# Patient Record
(demographics unavailable — no encounter records)

---

## 2024-10-15 NOTE — DEVELOPMENTAL MILESTONES
[Uses basic gestures] : uses basic gestures [Says "Jose" or "Mama"] : says "Jose" or "Mama" nonspecifically [Sits well without support] : sits well without support [Transitions between sitting and lying] : transitions between sitting and lying [Balances on hands and knees] : balances on hands and knees [Crawls] : crawls [Picks up small objects with 3 fingers] : picks up small objects with 3 fingers and thumb [Releases objects intentionally] : releases objects intentionally [[place] objects together] : bangs objects together [Normal Development] : Normal Development [None] : none [Yes] : Completed.

## 2024-10-15 NOTE — HISTORY OF PRESENT ILLNESS
[Mother] : mother [Breast milk] : breast milk [___ Feeding per 24 hrs] : a total of [unfilled] feedings is 24 hours [Fruit] : fruit [Vegetables] : vegetables [Cereal] : cereal [Meat] : meat [Eggs] : eggs [Peanut] : peanut [Baby food] : baby food [Normal] : Normal [___ voids per day] : [unfilled] voids per day [Frequency of stools: ___] : Frequency of stools: [unfilled]  stools [per day] : per day. [In Crib] : sleeps in crib [On back] : sleeps on back [Wakes up at night] : wakes up at night [Sippy Cup use] : sippy cup use [Screen time only for video chatting] : screen time only for video chatting [No] : No cigarette smoke exposure [Rear facing car seat in  back seat] : Rear facing car seat in  back seat [Carbon Monoxide Detectors] : Carbon monoxide detectors [Smoke Detectors] : Smoke detectors [NO] : No [Co-sleeping] : no co-sleeping [Pacifier use] : not using pacifier [Brushing teeth] : not brushing teeth [FreeTextEntry7] : noticing dry skin near neck and arms [de-identified] : feeds once at 2AM [FreeTextEntry1] : 9mo here for WCC. Has had a cough and congestion for 3 days, mom noticed rash on the body starting yesterday. Has been POing and urinating appropriately. Feeds multiple solids, does take a feed at night at 2AM.

## 2024-10-15 NOTE — PHYSICAL EXAM
[Alert] : alert [Normocephalic] : normocephalic [Flat Open Anterior [place]] : flat open anterior fontanelle [Red Reflex] : red reflex bilateral [PERRL] : PERRL [Normally Placed Ears] : normally placed ears [Auricles Well Formed] : auricles well formed [Clear Tympanic membranes] : clear tympanic membranes [Light reflex present] : light reflex present [Bony landmarks visible] : bony landmarks visible [Nares Patent] : nares patent [Palate Intact] : palate intact [Uvula Midline] : uvula midline [Supple, full passive range of motion] : supple, full passive range of motion [Symmetric Chest Rise] : symmetric chest rise [Clear to Auscultation Bilaterally] : clear to auscultation bilaterally [Regular Rate and Rhythm] : regular rate and rhythm [S1, S2 present] : S1, S2 present [+2 Femoral Pulses] : (+) 2 femoral pulses [Soft] : soft [Bowel Sounds] : bowel sounds present [Normal External Genitalia] : normal external genitalia [No Abnormal Lymph Nodes Palpated] : no abnormal lymph nodes palpated [Symmetric abduction and rotation of hips] : symmetric abduction and rotation of hips [Straight] : straight [Acute Distress] : no acute distress [Excessive Tearing] : no excessive tearing [Discharge] : no discharge [Palpable Masses] : no palpable masses [Murmurs] : no murmurs [Tender] : nontender [Distended] : nondistended [Hepatomegaly] : no hepatomegaly [Splenomegaly] : no splenomegaly [de-identified] : viral exanthum noted across torso and back

## 2024-10-15 NOTE — DEVELOPMENTAL MILESTONES
[Uses basic gestures] : uses basic gestures [Says "Jose" or "Mama"] : says "Jose" or "Mama" nonspecifically [Sits well without support] : sits well without support [Transitions between sitting and lying] : transitions between sitting and lying [Balances on hands and knees] : balances on hands and knees [Crawls] : crawls [Picks up small objects with 3 fingers] : picks up small objects with 3 fingers and thumb [Releases objects intentionally] : releases objects intentionally [[place] objects together] : bangs objects together [Normal Development] : Normal Development [None] : none [Yes] : Completed.

## 2024-10-15 NOTE — DISCUSSION/SUMMARY
[Normal Growth] : growth [Normal Development] : development [No Elimination Concerns] : elimination [No Medications] : ~He/She~ is not on any medications [Mother] : mother [] : The components of the vaccine(s) to be administered today are listed in the plan of care. The disease(s) for which the vaccine(s) are intended to prevent and the risks have been discussed with the caretaker.  The risks are also included in the appropriate vaccination information statements which have been provided to the patient's caregiver.  The caregiver has given consent to vaccinate. [de-identified] : eliminate nighttime feeds [de-identified] : viral exanthum present [FreeTextEntry1] : This is a 9mo here for [facility].   URI/viral exanthum: - Pt with mild URI symptoms and a viral exanthum; advised mom to do symptomatic management at home, and that rash will resolve on its own.  - Advised to present to ED if pt having any difficulty breathing, significantly decreased PO or UOP.   Night feeds: - still feeding once at night, advised mom to stop night feeds and allow child to sleep through the night  Maintenance: - received flu vaccine, given script for CBC and lead - SWYC negative  - lives in [place] - has Polyviflor at home - Continue breastmilk or formula as desired. Increase table foods, 3 meals with 2-3 snacks per day. Incorporate up to 6 oz of fluorinated water daily in a sippy cup. Discussed weaning of bottle and pacifier. Wipe teeth daily with washcloth. When in car, patient should be in rear-facing car seat in back seat. Put baby to sleep in own crib with no loose or soft bedding. Lower crib matress. Help baby to maintain consistent daily routines and sleep schedule. Recognize stranger anxiety. Ensure home is safe since baby is increasingly mobile. Be within arm's reach of baby at all times. Use consistent, positive discipline. Avoid screen time. Read aloud to baby.

## 2024-10-15 NOTE — DISCUSSION/SUMMARY
[Normal Growth] : growth [Normal Development] : development [No Elimination Concerns] : elimination [No Medications] : ~He/She~ is not on any medications [Mother] : mother [] : The components of the vaccine(s) to be administered today are listed in the plan of care. The disease(s) for which the vaccine(s) are intended to prevent and the risks have been discussed with the caretaker.  The risks are also included in the appropriate vaccination information statements which have been provided to the patient's caregiver.  The caregiver has given consent to vaccinate. [de-identified] : eliminate nighttime feeds [de-identified] : viral exanthum present [FreeTextEntry1] : This is a 9mo here for [facility].   URI/viral exanthum: - Pt with mild URI symptoms and a viral exanthum; advised mom to do symptomatic management at home, and that rash will resolve on its own.  - Advised to present to ED if pt having any difficulty breathing, significantly decreased PO or UOP.   Night feeds: - still feeding once at night, advised mom to stop night feeds and allow child to sleep through the night  Maintenance: - received flu vaccine, given script for CBC and lead - SWYC negative  - lives in [place] - has Polyviflor at home - Continue breastmilk or formula as desired. Increase table foods, 3 meals with 2-3 snacks per day. Incorporate up to 6 oz of fluorinated water daily in a sippy cup. Discussed weaning of bottle and pacifier. Wipe teeth daily with washcloth. When in car, patient should be in rear-facing car seat in back seat. Put baby to sleep in own crib with no loose or soft bedding. Lower crib matress. Help baby to maintain consistent daily routines and sleep schedule. Recognize stranger anxiety. Ensure home is safe since baby is increasingly mobile. Be within arm's reach of baby at all times. Use consistent, positive discipline. Avoid screen time. Read aloud to baby.

## 2024-10-15 NOTE — HISTORY OF PRESENT ILLNESS
[Mother] : mother [Breast milk] : breast milk [___ Feeding per 24 hrs] : a total of [unfilled] feedings is 24 hours [Fruit] : fruit [Vegetables] : vegetables [Cereal] : cereal [Meat] : meat [Eggs] : eggs [Peanut] : peanut [Baby food] : baby food [Normal] : Normal [___ voids per day] : [unfilled] voids per day [Frequency of stools: ___] : Frequency of stools: [unfilled]  stools [per day] : per day. [In Crib] : sleeps in crib [On back] : sleeps on back [Wakes up at night] : wakes up at night [Sippy Cup use] : sippy cup use [Screen time only for video chatting] : screen time only for video chatting [No] : No cigarette smoke exposure [Rear facing car seat in  back seat] : Rear facing car seat in  back seat [Carbon Monoxide Detectors] : Carbon monoxide detectors [Smoke Detectors] : Smoke detectors [NO] : No [Co-sleeping] : no co-sleeping [Pacifier use] : not using pacifier [Brushing teeth] : not brushing teeth [FreeTextEntry7] : noticing dry skin near neck and arms [de-identified] : feeds once at 2AM [FreeTextEntry1] : 9mo here for WCC. Has had a cough and congestion for 3 days, mom noticed rash on the body starting yesterday. Has been POing and urinating appropriately. Feeds multiple solids, does take a feed at night at 2AM.

## 2024-10-15 NOTE — PHYSICAL EXAM
[Alert] : alert [Normocephalic] : normocephalic [Flat Open Anterior [place]] : flat open anterior fontanelle [Red Reflex] : red reflex bilateral [PERRL] : PERRL [Normally Placed Ears] : normally placed ears [Auricles Well Formed] : auricles well formed [Clear Tympanic membranes] : clear tympanic membranes [Light reflex present] : light reflex present [Bony landmarks visible] : bony landmarks visible [Nares Patent] : nares patent [Palate Intact] : palate intact [Uvula Midline] : uvula midline [Supple, full passive range of motion] : supple, full passive range of motion [Symmetric Chest Rise] : symmetric chest rise [Clear to Auscultation Bilaterally] : clear to auscultation bilaterally [Regular Rate and Rhythm] : regular rate and rhythm [S1, S2 present] : S1, S2 present [+2 Femoral Pulses] : (+) 2 femoral pulses [Soft] : soft [Bowel Sounds] : bowel sounds present [Normal External Genitalia] : normal external genitalia [No Abnormal Lymph Nodes Palpated] : no abnormal lymph nodes palpated [Symmetric abduction and rotation of hips] : symmetric abduction and rotation of hips [Straight] : straight [Acute Distress] : no acute distress [Excessive Tearing] : no excessive tearing [Discharge] : no discharge [Palpable Masses] : no palpable masses [Murmurs] : no murmurs [Tender] : nontender [Distended] : nondistended [Hepatomegaly] : no hepatomegaly [Splenomegaly] : no splenomegaly [de-identified] : viral exanthum noted across torso and back

## 2024-11-14 NOTE — HISTORY OF PRESENT ILLNESS
[Influenza] : Influenza [FreeTextEntry1] :  Pt received influenza vaccine IM in  left thigh as ordered Pt tolerated administration of all vaccines Education provided on side effects of vaccines administered today Vaccine informational sheet given to parent

## 2025-03-04 NOTE — HISTORY OF PRESENT ILLNESS
[Mother] : mother [Breast milk] : breast milk [Fruit] : fruit [Vegetables] : vegetables [Meat] : meat [Dairy] : dairy [Finger food] : finger food [Table food] : table food [___ stools per day] : [unfilled]  stools per day [___ voids per day] : [unfilled] voids per day [Normal] : Normal [In crib] : In crib [Wakes up at night] : Wakes up at night [Sippy cup use] : Sippy cup use [Brushing teeth] : Brushing teeth [Toothpaste] : Primary Fluoride Source: Toothpaste [Playtime] : Playtime  [No] : No cigarette smoke exposure [Car seat in back seat] : Car seat in back seat [Up to date] : Up to date [Exposure to electronic nicotine delivery system] : No exposure to electronic nicotine delivery system [FreeTextEntry7] : Patient has been well, mild rash that is getting better  [de-identified] : Mild rash  [FreeTextEntry3] : Once nightly at 2am [de-identified] : Now that patient received 12mo vax in this visit  [FreeTextEntry1] : Patient is a healthy 13mo F presenting for her 12mo C. She has been growing and developing well. She is not a picky eater and eats Breakfast, lunch, and dinner, with breastfeeding once in the afternoon and at 2am. She can say mom and dad and is walking. She can feed herself with her hands. She uses a sippy cup, not a bottle. She does not use a pacifier. She stools 2x/d and has 6 wet diapers/d. She sleeps 9pm-2am, breastfeeds, and then sleeps until 7 or 8am. She takes 1 hour nap in the middle of the day. She brushes her teeth daily but has not yet seen a dentist. She has dry skin on her extremities, but it resolves with aquifer.   No changes to family medical hx. Of note, several family members have eczema, she has one Aunt with asthma, and has a cousin with a cystic kidney disease.

## 2025-03-04 NOTE — CARE PLAN
[Care Plan reviewed and provided to patient/caregiver] : Care plan reviewed and provided to patient/caregiver [Understands and communicates without difficulty] : Patient/Caregiver understands and communicates without difficulty [FreeTextEntry2] : Try to allow patient to sleep through the night and avoid 2am brestfeeding. Return to clinic in 4-6 weeks for 15mo WCC and Vaccines (no acute developmental concerns, but needs to stay UTD on vaccines) Schedule a dental appointment  Continue to support child's development through interactions and avoiding screentime  [FreeTextEntry3] : See patient in 4-6 weeks for vaccines.

## 2025-03-04 NOTE — HISTORY OF PRESENT ILLNESS
[Mother] : mother [Breast milk] : breast milk [Fruit] : fruit [Vegetables] : vegetables [Meat] : meat [Dairy] : dairy [Finger food] : finger food [Table food] : table food [___ stools per day] : [unfilled]  stools per day [___ voids per day] : [unfilled] voids per day [Normal] : Normal [In crib] : In crib [Wakes up at night] : Wakes up at night [Sippy cup use] : Sippy cup use [Brushing teeth] : Brushing teeth [Toothpaste] : Primary Fluoride Source: Toothpaste [Playtime] : Playtime  [No] : No cigarette smoke exposure [Car seat in back seat] : Car seat in back seat [Up to date] : Up to date [Exposure to electronic nicotine delivery system] : No exposure to electronic nicotine delivery system [FreeTextEntry7] : Patient has been well, mild rash that is getting better  [de-identified] : Mild rash  [FreeTextEntry3] : Once nightly at 2am [de-identified] : Now that patient received 12mo vax in this visit  [FreeTextEntry1] : Patient is a healthy 13mo F presenting for her 12mo C. She has been growing and developing well. She is not a picky eater and eats Breakfast, lunch, and dinner, with breastfeeding once in the afternoon and at 2am. She can say mom and dad and is walking. She can feed herself with her hands. She uses a sippy cup, not a bottle. She does not use a pacifier. She stools 2x/d and has 6 wet diapers/d. She sleeps 9pm-2am, breastfeeds, and then sleeps until 7 or 8am. She takes 1 hour nap in the middle of the day. She brushes her teeth daily but has not yet seen a dentist. She has dry skin on her extremities, but it resolves with aquifer.   No changes to family medical hx. Of note, several family members have eczema, she has one Aunt with asthma, and has a cousin with a cystic kidney disease.

## 2025-03-04 NOTE — DISCUSSION/SUMMARY
[Normal Growth] : growth [Normal Development] : development [None] : No known medical problems [No Elimination Concerns] : elimination [No Feeding Concerns] : feeding [No Skin Concerns] : skin [Normal Sleep Pattern] : sleep [No Medications] : ~He/She~ is not on any medications [Parent/Guardian] : parent/guardian [FreeTextEntry1] : Patient is a healthy 13mo F who is growing and developing well.   #Vaccines  - Today patient received 12mo Vaccines: PCV20, MMR, VSV, and HepA  - Patient has already been given 2 doses of the flu vaccine - Patient advised to come back to clinic in 4-6 weeks for 15 mo vaccines  #Feeding/Nutrition  - Patient should continue to eat balanced meals and be praised for trying new foods  - Patient's mom can continue to breastfeed in the afternoon and/or at bedtime, but patient should not get breastmilk at 2am. She can fast through the night with no risk to her health at this age.   # Dental Health  - MOC given list of dentists  - Encouraged to continue to help patient with toothbrushing   #Eczema  - Continue to use Aquiphor - If rash worsens, return to clinic for evaluation and possible prescription of hydrocort

## 2025-03-04 NOTE — PHYSICAL EXAM
[Alert] : alert [Normocephalic] : normocephalic [Closed Anterior [place]] : closed anterior fontanelle [Red Reflex] : red reflex bilateral [PERRL] : PERRL [Normally Placed Ears] : normally placed ears [Auricles Well Formed] : auricles well formed [Clear Tympanic membranes] : clear tympanic membranes [Light reflex present] : light reflex present [Bony landmarks visible] : bony landmarks visible [Nares Patent] : nares patent [Palate Intact] : palate intact [Uvula Midline] : uvula midline [Tooth Eruption] : tooth eruption [Supple, full passive range of motion] : supple, full passive range of motion [Symmetric Chest Rise] : symmetric chest rise [Clear to Auscultation Bilaterally] : clear to auscultation bilaterally [Regular Rate and Rhythm] : regular rate and rhythm [S1, S2 present] : S1, S2 present [+2 Femoral Pulses] : (+) 2 femoral pulses [Soft] : soft [Bowel Sounds] : normoactive bowel sounds [Normal External Genitalia] : normal external genitalia [Normal Vaginal Introitus] : normal vaginal introitus [No Abnormal Lymph Nodes Palpated] : no abnormal lymph nodes palpated [Symmetric Abduction and Rotation of Hips] : symmetric abduction and rotation of hips [Straight] : straight [Cranial Nerves Grossly Intact] : cranial nerves grossly intact [Discharge] : no discharge [Palpable Masses] : no palpable masses [Murmurs] : no murmurs [Tender] : nontender [Distended] : nondistended [Hepatomegaly] : no hepatomegaly [Splenomegaly] : no splenomegaly [Clitoromegaly] : no clitoromegaly [Allis Sign] : negative Allis sign [Rash or Lesions] : no rash/lesions

## 2025-03-04 NOTE — DEVELOPMENTAL MILESTONES
[Normal Development] : Normal Development [None] : none [Looks for hidden objects] : looks for hidden objects [Imitates new gestures] : imitates new gestures [Says "Dad" or "Mom" with meaning] : says "Dad" or "Mom" with meaning [Uses one word other than Mom or] : uses one word other than Mom or Dad or personal names [Follows a verbal command that] : follows a verbal command that includes a gesture [Takes first independent] : takes first independent steps [Stands without support] : stands without support [Drops object in a cup] : drops object in a cup [Picks up small object with 2 finger] : picks up small object with 2 finger pincer grasp [Picks up food and eats it] : picks up food and eats it [Yes] : Completed. [FreeTextEntry1] : Negative

## 2025-03-04 NOTE — PHYSICAL EXAM
[Alert] : alert [Normocephalic] : normocephalic [Closed Anterior [place]] : closed anterior fontanelle [Red Reflex] : red reflex bilateral [PERRL] : PERRL [Normally Placed Ears] : normally placed ears [Auricles Well Formed] : auricles well formed [Clear Tympanic membranes] : clear tympanic membranes [Light reflex present] : light reflex present [Bony landmarks visible] : bony landmarks visible [Nares Patent] : nares patent [Palate Intact] : palate intact [Uvula Midline] : uvula midline [Tooth Eruption] : tooth eruption [Supple, full passive range of motion] : supple, full passive range of motion [Symmetric Chest Rise] : symmetric chest rise [Clear to Auscultation Bilaterally] : clear to auscultation bilaterally [Regular Rate and Rhythm] : regular rate and rhythm [S1, S2 present] : S1, S2 present [+2 Femoral Pulses] : (+) 2 femoral pulses [Soft] : soft [Bowel Sounds] : normoactive bowel sounds [Normal External Genitalia] : normal external genitalia [Normal Vaginal Introitus] : normal vaginal introitus [No Abnormal Lymph Nodes Palpated] : no abnormal lymph nodes palpated [Symmetric Abduction and Rotation of Hips] : symmetric abduction and rotation of hips [Straight] : straight [Cranial Nerves Grossly Intact] : cranial nerves grossly intact [Discharge] : no discharge [Palpable Masses] : no palpable masses [Murmurs] : no murmurs [Tender] : nontender [Distended] : nondistended [Hepatomegaly] : no hepatomegaly [Splenomegaly] : no splenomegaly [Clitoromegaly] : no clitoromegaly [Allis Sign] : negative Allis sign [Rash or Lesions] : no rash/lesions

## 2025-06-12 NOTE — HISTORY OF PRESENT ILLNESS
[de-identified] : febrile seizure [FreeTextEntry6] : earlier this week - fever, with subsequent short seizure seen at outside hospital ED no interventions at that time has had a URI Dx with OM in ED seizure - simple, less than two minutes in duration runny nose feeling better no cough no vomiting or diarrhea acting well feeding well was prescribed antibiotics, Amoxicillin

## 2025-06-12 NOTE — DISCUSSION/SUMMARY
[FreeTextEntry1] : URI/OM resolved supportive care complete course of Amoxicillin  Simple febrile seizure discussed with mother  f/u as arranged for routine 18 month wcc

## 2025-07-20 NOTE — DISCUSSION/SUMMARY
[Family Support] : family support [Child Development and Behavior] : child development and behavior [Language Promotion/Hearing] : language promotion/hearing [Toliet Training Readiness] : toliet training readiness [Safety] : safety [FreeTextEntry1] : Growing and developing well Vitals WNL No acute concerns today Vaccines given including VZV #2 and Hep A #2 - tolerated well Amblyopia screen negative CBC and Lead today MCHAT screen negative SDOH family wellness screen reviewed  Anticipatory guidance: Discussed continuing a well-balanced diet. Continue table foods, whole cow's milk, 3 meals with 2-3 snacks per day.  Incorporate fluorinated water daily in a sippy cup. Brush teeth twice a day with soft toothbrush. Recommend visit to dentist.  When in car, keep child in rear-facing car seats until age 2, or until the maximum height and weight for seat is reached.  Put toddler to sleep in own bed or crib. Help toddler to maintain consistent daily routines and sleep schedule. Toilet training discussed.  Ensure home is safe. Use consistent, positive discipline.  Read aloud to toddler. Recognize anxiety in new settings. Be within arm's reach of toddler at all times.   RTC in 6 months for 2 yr [facility] or sooner should concerns arise

## 2025-07-20 NOTE — PHYSICAL EXAM

## 2025-07-20 NOTE — DISCUSSION/SUMMARY
[Family Support] : family support [Child Development and Behavior] : child development and behavior [Language Promotion/Hearing] : language promotion/hearing [Toliet Training Readiness] : toliet training readiness [Safety] : safety [FreeTextEntry1] : Growing and developing well Vitals WNL No acute concerns today Vaccines given including VZV #2 and Hep A #2 - tolerated well Amblyopia screen negative CBC and Lead today MCHAT screen negative SDOH family wellness screen reviewed  Anticipatory guidance: Discussed continuing a well-balanced diet. Continue table foods, whole cow's milk, 3 meals with 2-3 snacks per day.  Incorporate fluorinated water daily in a sippy cup. Brush teeth twice a day with soft toothbrush. Recommend visit to dentist.  When in car, keep child in rear-facing car seats until age 2, or until the maximum height and weight for seat is reached.  Put toddler to sleep in own bed or crib. Help toddler to maintain consistent daily routines and sleep schedule. Toilet training discussed.  Ensure home is safe. Use consistent, positive discipline.  Read aloud to toddler. Recognize anxiety in new settings. Be within arm's reach of toddler at all times.   RTC in 6 months for 2 yr [facility] or sooner should concerns arise

## 2025-07-20 NOTE — DEVELOPMENTAL MILESTONES
[Normal Development] : Normal Development [None] : none [Engages with others for play] : engages with others for play [Help dress and undress self] : help dress and undress self [Points to pictures in book] : points to pictures in book [Points to object of interest to] : points to object of interest to draw attention to it [Turns and looks at adult if] : turns and looks at adult if something new happens [Begins to scoop with spoon] : begins to scoop with spoon [Uses 6 to 10 words other than] : uses 6 to 10 words other than names [Identifies at least 2 body parts] : identifies at least 2 body parts [Walks up with 2 feet per step] : walks up with 2 feet per step with hand held [Carries toy while walking] : carries toy while walking [Scribbles spontaneously] : scribbles spontaneously [Throws small ball a few feet] : throws a small ball a few feet while standing [Passed] : passed

## 2025-07-20 NOTE — HISTORY OF PRESENT ILLNESS
[Cow's milk (Ounces per day ___)] : consumes [unfilled] oz of Cow's milk per day [Normal] : Normal [None] : Primary Fluoride Source: None [Temper Tantrums] : Temper Tantrums [Ready for Toilet Training] : ready for toilet training [No] : No cigarette smoke exposure [Water heater temperature set at <120 degrees F] : Water heater temperature set at <120 degrees F [Car seat in back seat] : Car seat in back seat [Carbon Monoxide Detectors] : Carbon monoxide detectors [Smoke Detectors] : Smoke detectors [Up to date] : Up to date

## 2025-07-20 NOTE — PHYSICAL EXAM
